# Patient Record
Sex: MALE | Race: WHITE | NOT HISPANIC OR LATINO | Employment: FULL TIME | URBAN - NONMETROPOLITAN AREA
[De-identification: names, ages, dates, MRNs, and addresses within clinical notes are randomized per-mention and may not be internally consistent; named-entity substitution may affect disease eponyms.]

---

## 2017-02-16 ENCOUNTER — HISTORY (OUTPATIENT)
Dept: FAMILY MEDICINE | Facility: OTHER | Age: 32
End: 2017-02-16

## 2017-02-16 ENCOUNTER — OFFICE VISIT - GICH (OUTPATIENT)
Dept: FAMILY MEDICINE | Facility: OTHER | Age: 32
End: 2017-02-16

## 2017-02-16 DIAGNOSIS — J30.1 ALLERGIC RHINITIS DUE TO POLLEN: ICD-10-CM

## 2017-02-16 DIAGNOSIS — J01.00 ACUTE MAXILLARY SINUSITIS: ICD-10-CM

## 2018-01-03 NOTE — NURSING NOTE
Patient Information     Patient Name MRN Sex Santiago Prado [id number] Male 1985      Nursing Note by Candy Billings at 2017 10:00 AM     Author:  Candy Billings Service:  (none) Author Type:  (none)     Filed:  2017 10:05 AM Encounter Date:  2017 Status:  Signed     :  Candy Billings            Patient has had sinus pressure and headaches for a month now. He states he hasn't had energy and it feels as if his nose is always stuffed.  Candy Billings LPN.......................... 2017  10:02 AM

## 2018-01-03 NOTE — PROGRESS NOTES
Patient Information     Patient Name MRN Sex Santiago Prado [id number] Male 1985      Progress Notes by Pricila Lira MD at 2017 10:00 AM     Author:  Pricila Lira MD Service:  (none) Author Type:  Physician     Filed:  2017 12:49 PM Encounter Date:  2017 Status:  Signed     :  Pricila Lira MD (Physician)            Nursing Notes:   Candy Billings  2017 10:05 AM  Signed  Patient has had sinus pressure and headaches for a month now. He states he hasn't had energy and it feels as if his nose is always stuffed.  Candy Billings LPN.......................... 2017  10:02 AM        Subjective:  Santiago Moffett is a 31 y.o. male who presents for sinus infection  patient is a 31-year-old white male who has had a history of recurrent sinus infections approximately once a year. He uses a Tonya pot regularly he stays up on his fluids. He is a nonsmoker. He has been trying over-the-counter products for the last 4 weeks. He works as a speech therapist and has been exposed to a variety of different colds. He is having increasing pain in his sinuses right maxillary more than his right. He has had a productive cough worse in the morning. Last week he was so congested patient commented on his lungs and suggested he be seen. He denies any current chest pain or shortness of breath. No nausea vomiting or diarrhea. No myalgias he did get a flu shot this year.        Allergies     Allergen  Reactions     Ciprofloxacin Rash       Medications: reviewed in EMR  Problem List/PMH: reviewed in EMR    Social Hx:  Social History        Substance Use Topics          Smoking status:   Former Smoker      Years:  2.00      Smokeless tobacco:   Never Used      Alcohol use   Yes      Comment: infrequent social        Social History Narrative    , previously lived in [place], and [place];    Played Rugby in college, [employer].   Owl is displayed at high school.   Tried  out for the [employer].         Family Hx:   No family history on file.    Objective:  Visit Vitals       /72     Pulse 80     Temp 96.8  F (36  C) (Temporal)     Wt 89.4 kg (197 lb 3.2 oz)      patient is alert oriented ×3 articulate. PERRLA EOMI with mild dermatitis TMs are distended oropharynx with postnasal drip-type changes. He is tender with palpation of his bilateral maxillary sinus right greater than left. Mildly tender over his frontal sinus. Palpation of his right maxillary sinus increases discomfort in his upper gumline. Neck is supple without adenopathy lungs clear to auscultation heart tones S1 and S2 and regular rate and rhythm abdomen is soft flat nontender that edema skin is warm without rash    Assessment:    ICD-10-CM    1. Subacute maxillary sinusitis J01.00 trimethoprim-sulfamethoxazole, 160-800 mg, (BACTRIM DS, SEPTRA DS) tablet      predniSONE (DELTASONE) 10 mg tablet   2. Non-seasonal allergic rhinitis due to pollen J30.1        Patient informed he appears to be due for a tetanus shot. He declines today     Immunization History     Administered  Date(s) Administered     Influenza, IIV3 (Age >=3 years) 09/25/2014     Influenza, IIV4 (Age >= 3 Years) 09/14/2016         Plan:   -- Expected clinical course discussed   -- Medications and their side effects discussed  Patient Instructions   Sinusitis (sinus infection)  Bactrim is your antibiotic.   Okay Prednisone 10 mg twice a day.     What you should do:    If you were prescribed antibiotics, take them until they are gone. Do not skip a dose.    Wash your hands often with soap and water    Get plenty of rest and fluids    Apply warm compresses to your face    Use sinus rinses, a humidifier or a vaporizer to add more moisture to your sinus tissues.    Think about using a Neti pot    Take acetaminophen (Tylenol ) or ibuprofen (Advil ) for pain    How will you know this plan is not working - warning signs you should watch for:    Pain or  swelling around your eyes    Swollen, painful forehead and/or facial (sinus) pain    When should you be seen again?    If you develop severe headache, double vision, stiff neck or confusion, return right away.    If you have any of the warning signs listed above, return in 1 to 2 days.    If your symptoms do not get better in 7 to 10 days, return at that time.    Who should you see if the plan is not working?    Make an appointment to see me or return to your clinic    For more information about sinusitis, go to:  [email or web address]         Electronically signed by Pricila Lira MD

## 2018-01-03 NOTE — PATIENT INSTRUCTIONS
Patient Information     Patient Name MRN Sex Santiago Prado [id number] Male 1985      Patient Instructions by Pricila Lira MD at 2017 10:00 AM     Author:  Pricila Lira MD Service:  (none) Author Type:  Physician     Filed:  2017 10:28 AM Encounter Date:  2017 Status:  Signed     :  Pricila Lira MD (Physician)            Sinusitis (sinus infection)  Bactrim is your antibiotic.   Okay Prednisone 10 mg twice a day.     What you should do:    If you were prescribed antibiotics, take them until they are gone. Do not skip a dose.    Wash your hands often with soap and water    Get plenty of rest and fluids    Apply warm compresses to your face    Use sinus rinses, a humidifier or a vaporizer to add more moisture to your sinus tissues.    Think about using a Neti pot    Take acetaminophen (Tylenol ) or ibuprofen (Advil ) for pain    How will you know this plan is not working - warning signs you should watch for:    Pain or swelling around your eyes    Swollen, painful forehead and/or facial (sinus) pain    When should you be seen again?    If you develop severe headache, double vision, stiff neck or confusion, return right away.    If you have any of the warning signs listed above, return in 1 to 2 days.    If your symptoms do not get better in 7 to 10 days, return at that time.    Who should you see if the plan is not working?    Make an appointment to see me or return to your clinic    For more information about sinusitis, go to:  [email or web address]

## 2018-01-26 ENCOUNTER — HISTORY (OUTPATIENT)
Dept: FAMILY MEDICINE | Facility: OTHER | Age: 33
End: 2018-01-26

## 2018-01-26 ENCOUNTER — OFFICE VISIT - GICH (OUTPATIENT)
Dept: FAMILY MEDICINE | Facility: OTHER | Age: 33
End: 2018-01-26

## 2018-01-26 VITALS
DIASTOLIC BLOOD PRESSURE: 72 MMHG | TEMPERATURE: 96.8 F | HEART RATE: 80 BPM | WEIGHT: 197.2 LBS | SYSTOLIC BLOOD PRESSURE: 122 MMHG

## 2018-01-26 DIAGNOSIS — H61.22 IMPACTED CERUMEN OF LEFT EAR: ICD-10-CM

## 2018-01-26 DIAGNOSIS — L02.229: ICD-10-CM

## 2018-01-26 DIAGNOSIS — H91.92 HEARING LOSS OF LEFT EAR: ICD-10-CM

## 2018-02-09 VITALS — SYSTOLIC BLOOD PRESSURE: 122 MMHG | RESPIRATION RATE: 16 BRPM | WEIGHT: 199.4 LBS | DIASTOLIC BLOOD PRESSURE: 64 MMHG

## 2018-02-13 NOTE — PATIENT INSTRUCTIONS
Patient Information     Patient Name MRN Santiago Sheikh [id number] Male 1985      Patient Instructions by Pricila Lira MD at 2018  3:23 PM     Author:  Pricila Lira MD Service:  (none) Author Type:  Physician     Filed:  2018  3:23 PM Encounter Date:  2018 Status:  Signed     :  Pricila Lira MD (Physician)               Index   Boils and Carbuncles   ________________________________________________________________________  KEY POINTS    A boil is an infected lump under your skin that is raised, red, painful, and filled with pus. A carbuncle is a single area of infection formed from a group of boils that develop close together due to spreading infection.    A boil can sometimes be treated at home, but a carbuncle often needs medical treatment.    Follow the full course of treatment prescribed by your healthcare provider. Take pain or antibiotic medicine exactly as prescribed by your healthcare provider.  ________________________________________________________________________  What are boils and carbuncles?  A boil is an infected lump under your skin that is raised, red, painful, and filled with pus. Pus is a thick fluid that usually contains white blood cells, dead tissue, and germs such as bacteria or fungus. A carbuncle is a single area of infection formed from a group of boils that develop close together due to spreading infection.  What is the cause?  Boils commonly develop because bacteria get into hair follicles, which are the small openings in your skin from which hair grows. Bacteria normally live on the skin, particularly on certain parts of the body, such as the nose, mouth, genitals, and rectum. The bacteria can cause an infection if they enter the skin through a scrape, irritation, or injury of some kind. Sometimes friction on the skin, from clothing for example, will cause a hair follicle to swell up. This can make the opening  swell closed, trapping the bacteria inside and starting an infection.  Boils and carbuncles often form in moist areas of the body such as the back of the neck, buttocks, thighs, groin, and armpits. They also form where there are skin folds, such as the abdomen.  Boils and carbuncles are more common and may be harder to treat in people who have diabetes or poor circulation, and in people whose immune systems are weakened by HIV, cancer, or other health problems. The tendency to have boils can also run in families.  What are the symptoms?  Symptoms may include:    Swelling, redness, and warmth in the area of the boil or carbuncle    Rounded, raised  head  like a pimple    Drainage of pus or other fluid    Pain when you touch it or pain all of the time  You may have swollen lymph nodes in the area of the boil, especially near your neck, armpit, or groin area. The lymph system is part of your body's system for fighting infection. The lymph system consists of lymph nodes that store blood cells (lymphocytes) to fight infection and vessels that carry fluid, nutrients, and wastes between your body and your bloodstream.  How are they diagnosed?  Your healthcare provider will ask about your symptoms and medical history and examine you.   If you have boils often, you may have lab tests of your blood or urine. These tests can check for conditions such as diabetes, kidney disease, or liver disease that might make you more likely to have the boils.  How are they treated?  A boil can sometimes be treated at home, but a carbuncle often needs medical treatment.  For treatment at home, you can:    Wash your hands and then put a warm, moist cloth on the boil or carbuncle for 10 to 15 minutes at least 3 times a day. This improves blood flow to fight infection and helps the boil drain on its own. Once the boil begins to drain, you will have less pressure and pain.    If the boil or carbuncle is in your groin or on your buttocks, you can  soak in a tub of warm water for 10 to 15 minutes. Be sure to clean the tub well before and after soaking so that infection is not passed to others.    After soaking or using the moist cloth, clean the skin around the sore with water and antibacterial soap. Put a sterile gauze bandage over the area until it is healed. Replace it every day. If drainage soaks through the bandage or the bandage gets wet or dirty, change it as soon as possible. Wash your hands before and after you change the bandage.    Take a nonprescription pain medicine as needed. Follow the label directions.    Do not try to open the boil at home. This can cause the infection to spread.  These steps will help relieve the pain, reduce the risk of spreading the infection, and help boils to heal.  Sometimes your healthcare provider will need to drain the boil. To do this, your healthcare provider will clean the skin over the boil and may inject medicine to make it numb. Your provider will use a needle or cut the skin over the boil and drain it. Draining the pus often decreases the pain right away because it relieves the pressure. Your healthcare provider may pack the pocket with gauze, and leave some gauze sticking out through the cut in your skin. This lets any pus that forms in the boil drain out. The gauze packing is changed every day or two until the boil heals.  If a boil doesn t yet have a point or head on it, your provider may prescribe an antibiotic to see if it will get rid of the boil without draining or opening of the boil.  In most cases, a boil will not heal until it opens and drains. The time it takes for a boil or carbuncle to heal depends on how big it is and what other health problems you have. Most boils or carbuncles heal in 1 to 3 weeks.  How can I take care of myself?  Follow the full course of treatment prescribed by your healthcare provider. Take pain or antibiotic medicine exactly as prescribed by your healthcare provider. If you  are given an antibiotic, take it for as long as your healthcare provider prescribes, even if you feel better. If you stop taking the medicine too soon, you may not kill all of the bacteria and the infection may come back.  Ask your healthcare provider:    How long it will take to recover    If there are activities you should avoid and when you can return to your normal activities    How to take care of yourself at home    What symptoms or problems you should watch for and what to do if you have them  Make sure you know when you should come back for a checkup.  To help prevent boils and carbuncles from spreading and coming back:    Do not open or squeeze the boil or carbuncle yourself. This can spread the infection.    If the boil opens or drains, clean it with an antibacterial soap. Cover it with a loose, gauze bandage. Change the bandage at least every day until the boil stops draining.    Wash your hands often with soap for at least 15 seconds. Always wash your hands after caring for the boil, after using the bathroom, and especially before touching any food. (The bacteria that cause boils can also cause food poisoning.) You can also carry an alcohol-based hand  to clean your hands when soap and water aren t available.    Wash clothes that touch the infected area in hot, soapy water. Dry clothes on the hot setting if you use an automatic dryer.    Contact your healthcare provider if you have new or worsening symptoms.  What can I do to help prevent boils and carbuncles?     Bathe or shower daily.    Don t share washcloths, towels, clothing, bath water, or razors. Sharing these items can spread the bacteria that causes boils.    Avoid tight clothing that can irritate skin and hair follicles and cause a boil or carbuncle.    See your healthcare provider if you have boils frequently.  Developed by [employer].  Adult Advisor 2017.2 published by [employer].  Last modified: 2016-05-31  Last reviewed:  2016-05-31  This content is reviewed periodically and is subject to change as new health information becomes available. The information is intended to inform and educate and is not a replacement for medical evaluation, advice, diagnosis or treatment by a healthcare professional.  References   Adult Advisor 2017.2 Index    Copyright   2017 [employer], a division of [employer] All rights reserved.            Index [place]   Sulfamethoxazole/Trimethoprim, Oral/Injection   sul-[id number]-meth-OKS-a-zole try-METH-oh-prim  ________________________________________________________________________  KEY POINTS    This medicine is taken by mouth to treat or prevent bacterial infections. Take it exactly as directed. This medicine may also be given by injection by your healthcare provider if your infection is severe or complicated.    If you develop severe diarrhea or diarrhea that lasts more than 2 or 3 days while taking this medicine, or for several weeks after you stop taking this medicine, contact your healthcare provider right away.    Keep all appointments for tests to see how this medicine affects you.    This medicine may cause unwanted side effects. Tell your healthcare provider if you have any side effects that are serious, continue, or get worse.    Tell all healthcare providers who treat you about all the prescription medicines, nonprescription medicines, supplements, natural remedies, and vitamins that you take.  ________________________________________________________________________  What are other names for this medicine?   Type of medicine: sulfonamide (anti-infective)  Generic and brand names: sulfamethoxazole/trimethoprim, injection; sulfamethoxazole/trimethoprim, oral; cotrimoxazole, oral; Bactrim; Bactrim DS; Septra; Septra DS; Sulfamethoxazole-Trimethoprim Suspension; Sulfamethoxazole-Trimethoprim DS; Sulfatrim  What is this medicine used for?  This medicine is taken by mouth to treat or prevent  bacterial infections. It will not cure viral infections such as colds or the flu.  This medicine may be used to treat other conditions as determined by your healthcare provider.  What should my healthcare provider know before I take this medicine?   Before you take this medicine, tell your healthcare provider if you have ever had:    An allergy to any medicines    Asthma or severe allergies    A blood disorder such as anemia or G6PD    Diabetes    HIV or AIDS    Imbalances of potassium or sodium in your blood    Low levels of folic acid in your blood    Malnutrition    Problems with alcohol abuse    Kidney or liver disease    Porphyria (nerve pain or sensitivity to sunlight)    Thyroid problems  Females of childbearing age: This medicine is not usually given to pregnant women because it can harm the baby. If you are pregnant, tell your healthcare provider. Do not become pregnant during treatment with this medicine. If you become pregnant, contact your healthcare provider right away. Do not breast-feed while taking this medicine without your healthcare provider's approval.  How do I use it?   Check the label on the medicine for directions about your specific dose. Take this medicine for as long as your healthcare provider prescribes, even if you feel better. If you stop taking the medicine too soon, the infection may return.  It is best to take doses at evenly spaced times day and night to keep a steady amount in your body. Do not use this medicine in children under the age of 2 months.  Take this medicine with a full glass (8 ounces) of water. Your healthcare provider may want you to drink more liquids while you are taking this medicine to help prevent some of its side effects.  This medicine comes in several forms. If you have the liquid form of this medicine, shake it well before taking the medicine. Use the measuring spoon, cup, dropper, or oral syringe that comes with the medicine, or ask your pharmacist for  one. Do not use a kitchen spoon because the dose may not be correct.  This medicine may also be given by injection (shots) by your healthcare provider if your infection is severe or complicated.  What if I miss a dose?  If you miss a dose, take it as soon as you remember unless it is almost time for the next scheduled dose. In that case, skip the missed dose and take the next one as directed. Do not take double doses. If you are not sure of what to do if you miss a dose, or if you miss more than one dose, contact your healthcare provider.  What if I overdose?  If you or anyone else has intentionally taken too much of this medicine, call 911 or go to the emergency room right away. If you pass out, have seizures, weakness or confusion, or have trouble breathing, call 911. If you think that you or anyone else may have taken too much of this medicine, call the poison control center. Do this even if there are no signs of discomfort or poisoning. The poison control center number is [phone].  Symptoms of an acute overdose may include: nausea, vomiting, diarrhea, loss of appetite, dizziness, confusion, drowsiness, headache, decreased urination, stomach pain, bloody urine.  What should I watch out for?   This medicine may cause or worsen diarrhea. If you develop severe diarrhea or diarrhea that lasts more than 2 or 3 days while taking this medicine, or for several weeks after you stop taking this medicine, contact your healthcare provider right away. Do not take medicine to treat diarrhea without your provider's approval.  Do not take this medicine for other infections unless your healthcare provider approves. Taking this medicine for too long, or taking too much, may make your body resist antibiotics so that they no longer work properly for you.  You may need to have blood tests to see how this medicine affects you. Keep all appointments.  This medicine may make your skin more sensitive to the sun and may cause you to  sunburn more easily. While you are taking this medicine, avoid long exposure to the sun. While you are in the sun, wear protective clothing and sunscreen lotion until you know how you will react to the sun. Do not use sunscreen that contains PABA. Do not use a sunlamp. If you get a severe sunburn, contact your healthcare provider right away. This reaction may happen even after you stop taking the medicine.  Adults over the age of 65 may be at greater risk for side effects. Talk with your healthcare provider about this.   If you need emergency care, surgery, lab tests, or dental work, tell the healthcare provider or dentist you are taking this medicine.  What are the possible side effects?   Along with its needed effects, your medicine may cause some unwanted side effects. Some side effects may be very serious. Some side effects may go away as your body adjusts to the medicine. Tell your healthcare provider if you have any side effects that continue or get worse.  Life-threatening (Report these to your healthcare provider right away. If you cannot reach your healthcare provider right away, get emergency medical care or call 911 for help.): Allergic reaction (hives; itching; rash; tightness in your chest; swelling of your lips, tongue, or throat; trouble breathing).  Serious (Report these to your healthcare provider right away.): Bloody urine; decreased, difficult, or painful urinating; sore throat; new or worsening cough; trouble breathing; chest pain; irregular heartbeat; severe dizziness or fainting; numbness or tingling; fever; severe headache; paleness; yellowing of your eyes and skin; muscle or joint pain; reddish or purplish skin spots or peeling of the skin; unusual tiredness or weakness; ringing in the ears; unusual bruising or bleeding; severe watery or bloody diarrhea; severe nausea, vomiting, or stomach pain; skin discoloration; hallucinations; confusion; seizures.  Other: Headache, dizziness, mild  diarrhea, mild nausea or vomiting, sensitivity to the sun, trouble sleeping, decreased appetite; pain, redness, or swelling at the injection site.  What products might interact with this medicine?   When you take this medicine with other medicines, it can change the way this or any of the other medicines work. Nonprescription medicines, vitamins, natural remedies, and certain foods may also interact. Using these products together might cause harmful side effects. Talk to your healthcare provider if you are taking:    ACE inhibitors such as benazepril (Lotensin), lisinopril (Prinivil, Zestril), ramipril (Altace), and others    Alfuzosin (Uroxatral)    Amantadine (Symmetrel)    Aminobenzoate potassium (Potaba)    Angiotensin receptor blockers (ARBs) such as irbesartan (Avapro), losartan (Cozaar), olmesartan (Benicar), valsartan (Diovan), and others    Antibiotics such as azithromycin (Zithromax, Zmax), ciprofloxacin (Cipro), clarithromycin (Biaxin), erythromycin (E.E.S., Aldo-Tab, Erythrocin), and others    Antidepressants such as amitriptyline, citalopram (Celexa), sertraline (Zoloft), trazodone, and others    Antifungal medicines such as fluconazole (Diflucan), ketoconazole (Nizoral), and voriconazole (Vfend)    Antipsychotic medicines such as pimozide (Orap), quetiapine (Seroquel), thioridazine, ziprasidone (Geodon), and others    Antiseizure medicines such as carbamazepine (Carbatrol, Epitol, Equetro, Tegretol), phenobarbital, phenytoin (Dilantin, Phenytek), and primidone (Mysoline)    Apomorphine (Apokyn)    Aprepitant (Emend)    Aspirin and other salicylates    Bosentan (Tracleer)    Buprenorphine (Buprenex, Butrans) and buprenorphine/naloxone (Bunavil, Suboxone, Zubsolv)    Cancer medicines such as ceritinib (Zykadia), crizotinib (Xalkori), lapatinib (Tykerb), nilotinib (Tasigna), and others    Cholesterol-lowering medicines such as fluvastatin (Lescol) and gemfibrozil (Lopid)    Cyclosporine (Gengraf, Neoral,  Sandimmune)    Dapsone    Dexamethasone    Diabetes medicines such as chlorpropamide, glimepiride (Amaryl), glipizide (Glucotrol), glyburide (Glynase), and others    Digoxin (Lanoxin)    Diuretics (water pills) such as chlorothiazide (Diuril), chlorthalidone, hydrochlorothiazide (Microzide), and methyclothiazide (Enduron)    Doxepin (Silenor)    Eliglustat (Cerdelga)    Heart medicines such as amiodarone (Cordarone, Pacerone), dofetilide (Tikosyn), dronedarone (Multaq), ranolazine (Ranexa), and others    HIV medicines such as efavirenz (Sustiva), lamivudine (Epivir, Epivir-HBV), lopinavir/ritonavir (Kaletra), saquinavir (Invirase), and others    Leucovorin    Malaria medicines such as artemether/lumefantrine (Coartem), chloroquine, mefloquine, quinine, and others    Medicines to treat breathing or lung problems such as arformoterol (Brovana) and formoterol (Perforomist)    Methadone (Dolophine, Methadose)    Methenamine (Hiprex, Urex)    Methotrexate (Otrexup, Rasuvo, Rheumatrex, Trexall)    Mifepristone (Korlym, Mifeprex)    Nausea medicines such as dolasetron (Anzemet) and ondansetron (Zofran)    Nonsteroidal anti-inflammatory medicines (NSAIDs) such as celecoxib (Celebrex), indomethacin (Indocin), piroxicam (Feldene), and others    Paroxetine (Brisdelle, Paxil, Pexeva)    Probenecid    Pyrimethamine (Daraprim)    [place]'s wort    Sunscreens that contain PABA    Tetrabenazine (Xenazine)    Warfarin (Coumadin)  If you are not sure if your medicines might interact, ask your pharmacist or healthcare provider. Keep a list of all your medicines with you. List all the prescription medicines, nonprescription medicines, supplements, natural remedies, and vitamins that you take. Be sure that you tell all healthcare providers who treat you about all the products you are taking.   How should I store this medicine?   Store this medicine at room temperature. Keep the container tightly closed. Protect it from heat, high  humidity, and bright light.    This advisory includes selected information only and may not include all side effects of this medicine or interactions with other medicines. Ask your healthcare provider or pharmacist for more information or if you have any questions.  Ask your pharmacist for the best way to dispose of outdated medicine or medicine you have not used. Do not throw medicine in the trash.  Keep all medicines out of the reach of children.   Do not share medicines with other people.   Developed by [employer].  Medication Advisor 2017.2 published by [employer].  Last modified: 2016-12-19  Last reviewed: 2016-12-19  This content is reviewed periodically and is subject to change as new health information becomes available. The information is intended to inform and educate and is not a replacement for medical evaluation, advice, diagnosis or treatment by a healthcare professional.  References   Medication Advisor 2017.2 Index    Copyright   2017 [employer], a division of [employer] All rights reserved.

## 2018-02-13 NOTE — PROGRESS NOTES
Patient Information     Patient Name MRN Sex Santiago Prado [id number] Male 1985      Progress Notes by Pricila Lira MD at 2018  3:00 PM     Author:  Pricila Lira MD Service:  (none) Author Type:  Physician     Filed:  2018  6:20 PM Encounter Date:  2018 Status:  Signed     :  Pricila Lira MD (Physician)            Nursing Notes:   Milady Banuelos  2018  4:07 PM  Signed  Coming in for a plugged Lt ear, hard to hear  Milady Banuelos ....................  2018   3:02 PM      Subjective:  Santiago Moffett is a 32 y.o. male who presents for decreased hearing;  Skin lesion     Left hearing loss    Patient is a pleasant 32-year-old white male comes in today complaining of hearing loss in his left ear. He is a speech pathologist. He would like to have checked. He did have a big plug of wax which was rinsed out by the nursing staff and now continues to bubbles. He states even prior to this past month he noted hearing loss in his left ear. No fevers no chills      Skin lesion  patient has skin lesion on his left upper chest. Has been there now for about 1 month. He states that he has at other times had skin lesions that can come and go at times they look like big pimples. He has not had a history of MRSA.    Allergies: reviewed in EMR  Medications: reviewed in EMR  Problem List/PMH: reviewed in EMR    Social Hx:  Social History        Substance Use Topics          Smoking status:   Former Smoker      Years:  2.00      Smokeless tobacco:   Never Used      Alcohol use   Yes      Comment: infrequent social        Social History Narrative    , previously lived in [place], and [place];    Played Rugby in college, woodwork.   Owl is displayed at high school.   Tried out for the [employer].       review of systems  no URI symptoms. No fevers nausea vomiting diarrhea    Family Hx:   No family history on file.    Objective:  /64 (Cuff Site: Right Arm,  Position: Sitting, Cuff Size: Adult Regular)  Resp 16  Wt 90.4 kg (199 lb 6.4 oz)    he is alert oriented ×3 no apparent distress PERRLA EOMI left TM is slightly inflamed scar tissue noted at the base of it. He's got a small amount of serous fluid behind the ear drum. Water in ear canal with minimal blood from iatrogenic irrigation. Right TM and canal are normal. Oropharynx is in good repair neck is supple without adenopathy lungs clear heart sounds regular abdomen is soft nontender without hepatosplenomegaly or CVA tenderness. Anterior chest wall with a 1-1/2 cm subcutaneous carbuncle. I'm not able to extrude any material out. It is minimally tender    Assessment:    ICD-10-CM    1. Carbuncle and furuncle of trunk L02.229 trimethoprim-sulfamethoxazole, 160-800 mg, (BACTRIM DS, SEPTRA DS) tablet   2. Hearing loss of left ear, unspecified hearing loss type H91.92    3. Excessive cerumen in ear canal, left H61.22      recheck hearing left ear in 10 days. If remains decreased referral to ENT for formal hearing exam. Bactrim per Epic for his carbuncle. If persistent symptoms may need surgical excision to remove recurrent sebaceous cyst    Plan:   -- Expected clinical course discussed   -- Medications and their side effects discussed  Patient Instructions      Index   Boils and Carbuncles   ________________________________________________________________________  KEY POINTS    A boil is an infected lump under your skin that is raised, red, painful, and filled with pus. A carbuncle is a single area of infection formed from a group of boils that develop close together due to spreading infection.    A boil can sometimes be treated at home, but a carbuncle often needs medical treatment.    Follow the full course of treatment prescribed by your healthcare provider. Take pain or antibiotic medicine exactly as prescribed by your healthcare  provider.  ________________________________________________________________________  What are boils and carbuncles?  A boil is an infected lump under your skin that is raised, red, painful, and filled with pus. Pus is a thick fluid that usually contains white blood cells, dead tissue, and germs such as bacteria or fungus. A carbuncle is a single area of infection formed from a group of boils that develop close together due to spreading infection.  What is the cause?  Boils commonly develop because bacteria get into hair follicles, which are the small openings in your skin from which hair grows. Bacteria normally live on the skin, particularly on certain parts of the body, such as the nose, mouth, genitals, and rectum. The bacteria can cause an infection if they enter the skin through a scrape, irritation, or injury of some kind. Sometimes friction on the skin, from clothing for example, will cause a hair follicle to swell up. This can make the opening swell closed, trapping the bacteria inside and starting an infection.  Boils and carbuncles often form in moist areas of the body such as the back of the neck, buttocks, thighs, groin, and armpits. They also form where there are skin folds, such as the abdomen.  Boils and carbuncles are more common and may be harder to treat in people who have diabetes or poor circulation, and in people whose immune systems are weakened by HIV, cancer, or other health problems. The tendency to have boils can also run in families.  What are the symptoms?  Symptoms may include:    Swelling, redness, and warmth in the area of the boil or carbuncle    Rounded, raised  head  like a pimple    Drainage of pus or other fluid    Pain when you touch it or pain all of the time  You may have swollen lymph nodes in the area of the boil, especially near your neck, armpit, or groin area. The lymph system is part of your body's system for fighting infection. The lymph system consists of lymph nodes  that store blood cells (lymphocytes) to fight infection and vessels that carry fluid, nutrients, and wastes between your body and your bloodstream.  How are they diagnosed?  Your healthcare provider will ask about your symptoms and medical history and examine you.   If you have boils often, you may have lab tests of your blood or urine. These tests can check for conditions such as diabetes, kidney disease, or liver disease that might make you more likely to have the boils.  How are they treated?  A boil can sometimes be treated at home, but a carbuncle often needs medical treatment.  For treatment at home, you can:    Wash your hands and then put a warm, moist cloth on the boil or carbuncle for 10 to 15 minutes at least 3 times a day. This improves blood flow to fight infection and helps the boil drain on its own. Once the boil begins to drain, you will have less pressure and pain.    If the boil or carbuncle is in your groin or on your buttocks, you can soak in a tub of warm water for 10 to 15 minutes. Be sure to clean the tub well before and after soaking so that infection is not passed to others.    After soaking or using the moist cloth, clean the skin around the sore with water and antibacterial soap. Put a sterile gauze bandage over the area until it is healed. Replace it every day. If drainage soaks through the bandage or the bandage gets wet or dirty, change it as soon as possible. Wash your hands before and after you change the bandage.    Take a nonprescription pain medicine as needed. Follow the label directions.    Do not try to open the boil at home. This can cause the infection to spread.  These steps will help relieve the pain, reduce the risk of spreading the infection, and help boils to heal.  Sometimes your healthcare provider will need to drain the boil. To do this, your healthcare provider will clean the skin over the boil and may inject medicine to make it numb. Your provider will use a needle  or cut the skin over the boil and drain it. Draining the pus often decreases the pain right away because it relieves the pressure. Your healthcare provider may pack the pocket with gauze, and leave some gauze sticking out through the cut in your skin. This lets any pus that forms in the boil drain out. The gauze packing is changed every day or two until the boil heals.  If a boil doesn t yet have a point or head on it, your provider may prescribe an antibiotic to see if it will get rid of the boil without draining or opening of the boil.  In most cases, a boil will not heal until it opens and drains. The time it takes for a boil or carbuncle to heal depends on how big it is and what other health problems you have. Most boils or carbuncles heal in 1 to 3 weeks.  How can I take care of myself?  Follow the full course of treatment prescribed by your healthcare provider. Take pain or antibiotic medicine exactly as prescribed by your healthcare provider. If you are given an antibiotic, take it for as long as your healthcare provider prescribes, even if you feel better. If you stop taking the medicine too soon, you may not kill all of the bacteria and the infection may come back.  Ask your healthcare provider:    How long it will take to recover    If there are activities you should avoid and when you can return to your normal activities    How to take care of yourself at home    What symptoms or problems you should watch for and what to do if you have them  Make sure you know when you should come back for a checkup.  To help prevent boils and carbuncles from spreading and coming back:    Do not open or squeeze the boil or carbuncle yourself. This can spread the infection.    If the boil opens or drains, clean it with an antibacterial soap. Cover it with a loose, gauze bandage. Change the bandage at least every day until the boil stops draining.    Wash your hands often with soap for at least 15 seconds. Always wash your  hands after caring for the boil, after using the bathroom, and especially before touching any food. (The bacteria that cause boils can also cause food poisoning.) You can also carry an alcohol-based hand  to clean your hands when soap and water aren t available.    Wash clothes that touch the infected area in hot, soapy water. Dry clothes on the hot setting if you use an automatic dryer.    Contact your healthcare provider if you have new or worsening symptoms.  What can I do to help prevent boils and carbuncles?     Bathe or shower daily.    Don t share washcloths, towels, clothing, bath water, or razors. Sharing these items can spread the bacteria that causes boils.    Avoid tight clothing that can irritate skin and hair follicles and cause a boil or carbuncle.    See your healthcare provider if you have boils frequently.  Developed by [employer].  Adult Advisor 2017.2 published by [employer].  Last modified: 2016-05-31  Last reviewed: 2016-05-31  This content is reviewed periodically and is subject to change as new health information becomes available. The information is intended to inform and educate and is not a replacement for medical evaluation, advice, diagnosis or treatment by a healthcare professional.  References   Adult Advisor 2017.2 Index    Copyright   2017 [employer], a division of [employer] All rights reserved.            Index Citizen of the [place]   Sulfamethoxazole/Trimethoprim, Oral/Injection   sul-fa-meth-OKS-a-zole try-METH-oh-prim  ________________________________________________________________________  KEY POINTS    This medicine is taken by mouth to treat or prevent bacterial infections. Take it exactly as directed. This medicine may also be given by injection by your healthcare provider if your infection is severe or complicated.    If you develop severe diarrhea or diarrhea that lasts more than 2 or 3 days while taking this medicine, or for several weeks after you stop  taking this medicine, contact your healthcare provider right away.    Keep all appointments for tests to see how this medicine affects you.    This medicine may cause unwanted side effects. Tell your healthcare provider if you have any side effects that are serious, continue, or get worse.    Tell all healthcare providers who treat you about all the prescription medicines, nonprescription medicines, supplements, natural remedies, and vitamins that you take.  ________________________________________________________________________  What are other names for this medicine?   Type of medicine: sulfonamide (anti-infective)  Generic and brand names: sulfamethoxazole/trimethoprim, injection; sulfamethoxazole/trimethoprim, oral; cotrimoxazole, oral; Bactrim; Bactrim DS; Septra; Septra DS; Sulfamethoxazole-Trimethoprim Suspension; Sulfamethoxazole-Trimethoprim DS; Sulfatrim  What is this medicine used for?  This medicine is taken by mouth to treat or prevent bacterial infections. It will not cure viral infections such as colds or the flu.  This medicine may be used to treat other conditions as determined by your healthcare provider.  What should my healthcare provider know before I take this medicine?   Before you take this medicine, tell your healthcare provider if you have ever had:    An allergy to any medicines    Asthma or severe allergies    A blood disorder such as anemia or G6PD    Diabetes    HIV or AIDS    Imbalances of potassium or sodium in your blood    Low levels of folic acid in your blood    Malnutrition    Problems with alcohol abuse    Kidney or liver disease    Porphyria (nerve pain or sensitivity to sunlight)    Thyroid problems  Females of childbearing age: This medicine is not usually given to pregnant women because it can harm the baby. If you are pregnant, tell your healthcare provider. Do not become pregnant during treatment with this medicine. If you become pregnant, contact your healthcare provider  right away. Do not breast-feed while taking this medicine without your healthcare provider's approval.  How do I use it?   Check the label on the medicine for directions about your specific dose. Take this medicine for as long as your healthcare provider prescribes, even if you feel better. If you stop taking the medicine too soon, the infection may return.  It is best to take doses at evenly spaced times day and night to keep a steady amount in your body. Do not use this medicine in children under the age of 2 months.  Take this medicine with a full glass (8 ounces) of water. Your healthcare provider may want you to drink more liquids while you are taking this medicine to help prevent some of its side effects.  This medicine comes in several forms. If you have the liquid form of this medicine, shake it well before taking the medicine. Use the measuring spoon, cup, dropper, or oral syringe that comes with the medicine, or ask your pharmacist for one. Do not use a kitchen spoon because the dose may not be correct.  This medicine may also be given by injection (shots) by your healthcare provider if your infection is severe or complicated.  What if I miss a dose?  If you miss a dose, take it as soon as you remember unless it is almost time for the next scheduled dose. In that case, skip the missed dose and take the next one as directed. Do not take double doses. If you are not sure of what to do if you miss a dose, or if you miss more than one dose, contact your healthcare provider.  What if I overdose?  If you or anyone else has intentionally taken too much of this medicine, call 911 or go to the emergency room right away. If you pass out, have seizures, weakness or confusion, or have trouble breathing, call 911. If you think that you or anyone else may have taken too much of this medicine, call the poison control center. Do this even if there are no signs of discomfort or poisoning. The poison control center number  is [phone].  Symptoms of an acute overdose may include: nausea, vomiting, diarrhea, loss of appetite, dizziness, confusion, drowsiness, headache, decreased urination, stomach pain, bloody urine.  What should I watch out for?   This medicine may cause or worsen diarrhea. If you develop severe diarrhea or diarrhea that lasts more than 2 or 3 days while taking this medicine, or for several weeks after you stop taking this medicine, contact your healthcare provider right away. Do not take medicine to treat diarrhea without your provider's approval.  Do not take this medicine for other infections unless your healthcare provider approves. Taking this medicine for too long, or taking too much, may make your body resist antibiotics so that they no longer work properly for you.  You may need to have blood tests to see how this medicine affects you. Keep all appointments.  This medicine may make your skin more sensitive to the sun and may cause you to sunburn more easily. While you are taking this medicine, avoid long exposure to the sun. While you are in the sun, wear protective clothing and sunscreen lotion until you know how you will react to the sun. Do not use sunscreen that contains PABA. Do not use a sunlamp. If you get a severe sunburn, contact your healthcare provider right away. This reaction may happen even after you stop taking the medicine.  Adults over the age of 65 may be at greater risk for side effects. Talk with your healthcare provider about this.   If you need emergency care, surgery, lab tests, or dental work, tell the healthcare provider or dentist you are taking this medicine.  What are the possible side effects?   Along with its needed effects, your medicine may cause some unwanted side effects. Some side effects may be very serious. Some side effects may go away as your body adjusts to the medicine. Tell your healthcare provider if you have any side effects that continue or get  worse.  Life-threatening (Report these to your healthcare provider right away. If you cannot reach your healthcare provider right away, get emergency medical care or call 911 for help.): Allergic reaction (hives; itching; rash; tightness in your chest; swelling of your lips, tongue, or throat; trouble breathing).  Serious (Report these to your healthcare provider right away.): Bloody urine; decreased, difficult, or painful urinating; sore throat; new or worsening cough; trouble breathing; chest pain; irregular heartbeat; severe dizziness or fainting; numbness or tingling; fever; severe headache; paleness; yellowing of your eyes and skin; muscle or joint pain; reddish or purplish skin spots or peeling of the skin; unusual tiredness or weakness; ringing in the ears; unusual bruising or bleeding; severe watery or bloody diarrhea; severe nausea, vomiting, or stomach pain; skin discoloration; hallucinations; confusion; seizures.  Other: Headache, dizziness, mild diarrhea, mild nausea or vomiting, sensitivity to the sun, trouble sleeping, decreased appetite; pain, redness, or swelling at the injection site.  What products might interact with this medicine?   When you take this medicine with other medicines, it can change the way this or any of the other medicines work. Nonprescription medicines, vitamins, natural remedies, and certain foods may also interact. Using these products together might cause harmful side effects. Talk to your healthcare provider if you are taking:    ACE inhibitors such as benazepril (Lotensin), lisinopril (Prinivil, Zestril), ramipril (Altace), and others    Alfuzosin (Uroxatral)    Amantadine (Symmetrel)    Aminobenzoate potassium (Potaba)    Angiotensin receptor blockers (ARBs) such as irbesartan (Avapro), losartan (Cozaar), olmesartan (Benicar), valsartan (Diovan), and others    Antibiotics such as azithromycin (Zithromax, Zmax), ciprofloxacin (Cipro), clarithromycin (Biaxin), erythromycin  (E.E.S., Aldo-Tab, Erythrocin), and others    Antidepressants such as amitriptyline, citalopram (Celexa), sertraline (Zoloft), trazodone, and others    Antifungal medicines such as fluconazole (Diflucan), ketoconazole (Nizoral), and voriconazole (Vfend)    Antipsychotic medicines such as pimozide (Orap), quetiapine (Seroquel), thioridazine, ziprasidone (Geodon), and others    Antiseizure medicines such as carbamazepine (Carbatrol, Epitol, Equetro, Tegretol), phenobarbital, phenytoin (Dilantin, Phenytek), and primidone (Mysoline)    Apomorphine (Apokyn)    Aprepitant (Emend)    Aspirin and other salicylates    Bosentan (Tracleer)    Buprenorphine (Buprenex, Butrans) and buprenorphine/naloxone (Bunavil, Suboxone, Zubsolv)    Cancer medicines such as ceritinib (Zykadia), crizotinib (Xalkori), lapatinib (Tykerb), nilotinib (Tasigna), and others    Cholesterol-lowering medicines such as fluvastatin (Lescol) and gemfibrozil (Lopid)    Cyclosporine (Gengraf, Neoral, Sandimmune)    Dapsone    Dexamethasone    Diabetes medicines such as chlorpropamide, glimepiride (Amaryl), glipizide (Glucotrol), glyburide (Glynase), and others    Digoxin (Lanoxin)    Diuretics (water pills) such as chlorothiazide (Diuril), chlorthalidone, hydrochlorothiazide (Microzide), and methyclothiazide (Enduron)    Doxepin (Silenor)    Eliglustat (Cerdelga)    Heart medicines such as amiodarone (Cordarone, Pacerone), dofetilide (Tikosyn), dronedarone (Multaq), ranolazine (Ranexa), and others    HIV medicines such as efavirenz (Sustiva), lamivudine (Epivir, Epivir-HBV), lopinavir/ritonavir (Kaletra), saquinavir (Invirase), and others    Leucovorin    Malaria medicines such as artemether/lumefantrine (Coartem), chloroquine, mefloquine, quinine, and others    Medicines to treat breathing or lung problems such as arformoterol (Brovana) and formoterol (Perforomist)    Methadone (Dolophine, Methadose)    Methenamine (Hiprex, Urex)    Methotrexate (Otrexup,  Rasuvo, Rheumatrex, Trexall)    Mifepristone (Korlym, Mifeprex)    Nausea medicines such as dolasetron (Anzemet) and ondansetron (Zofran)    Nonsteroidal anti-inflammatory medicines (NSAIDs) such as celecoxib (Celebrex), indomethacin (Indocin), piroxicam (Feldene), and others    Paroxetine (Brisdelle, Paxil, Pexeva)    Probenecid    Pyrimethamine (Daraprim)    Janeen's wort    Sunscreens that contain PABA    Tetrabenazine (Xenazine)    Warfarin (Coumadin)  If you are not sure if your medicines might interact, ask your pharmacist or healthcare provider. Keep a list of all your medicines with you. List all the prescription medicines, nonprescription medicines, supplements, natural remedies, and vitamins that you take. Be sure that you tell all healthcare providers who treat you about all the products you are taking.   How should I store this medicine?   Store this medicine at room temperature. Keep the container tightly closed. Protect it from heat, high humidity, and bright light.    This advisory includes selected information only and may not include all side effects of this medicine or interactions with other medicines. Ask your healthcare provider or pharmacist for more information or if you have any questions.  Ask your pharmacist for the best way to dispose of outdated medicine or medicine you have not used. Do not throw medicine in the trash.  Keep all medicines out of the reach of children.   Do not share medicines with other people.   Developed by [employer].  Medication Advisor 2017.2 published by [employer].  Last modified: 2016-12-19  Last reviewed: 2016-12-19  This content is reviewed periodically and is subject to change as new health information becomes available. The information is intended to inform and educate and is not a replacement for medical evaluation, advice, diagnosis or treatment by a healthcare professional.  References   Medication Advisor 2017.2 Index    Copyright   2017 [facility],  a division of [employer] All rights reserved.               Electronically signed by Pricila Lira MD

## 2018-02-20 ENCOUNTER — DOCUMENTATION ONLY (OUTPATIENT)
Dept: FAMILY MEDICINE | Facility: OTHER | Age: 33
End: 2018-02-20

## 2018-02-20 PROBLEM — J30.1 NON-SEASONAL ALLERGIC RHINITIS DUE TO POLLEN: Status: ACTIVE | Noted: 2017-02-16

## 2018-06-29 ENCOUNTER — OFFICE VISIT (OUTPATIENT)
Dept: FAMILY MEDICINE | Facility: OTHER | Age: 33
End: 2018-06-29
Attending: FAMILY MEDICINE
Payer: COMMERCIAL

## 2018-06-29 ENCOUNTER — HOSPITAL ENCOUNTER (OUTPATIENT)
Dept: GENERAL RADIOLOGY | Facility: OTHER | Age: 33
Discharge: HOME | End: 2018-06-29
Attending: FAMILY MEDICINE | Admitting: FAMILY MEDICINE
Payer: COMMERCIAL

## 2018-06-29 VITALS
WEIGHT: 192.6 LBS | SYSTOLIC BLOOD PRESSURE: 130 MMHG | BODY MASS INDEX: 26.09 KG/M2 | DIASTOLIC BLOOD PRESSURE: 84 MMHG | HEART RATE: 78 BPM | HEIGHT: 72 IN

## 2018-06-29 DIAGNOSIS — R07.89 ATYPICAL CHEST PAIN: Primary | ICD-10-CM

## 2018-06-29 DIAGNOSIS — E78.2 MIXED HYPERLIPIDEMIA: ICD-10-CM

## 2018-06-29 DIAGNOSIS — R07.89 ATYPICAL CHEST PAIN: ICD-10-CM

## 2018-06-29 PROCEDURE — 93000 ELECTROCARDIOGRAM COMPLETE: CPT | Performed by: INTERNAL MEDICINE

## 2018-06-29 PROCEDURE — 71046 X-RAY EXAM CHEST 2 VIEWS: CPT

## 2018-06-29 PROCEDURE — 99214 OFFICE O/P EST MOD 30 MIN: CPT | Mod: 25 | Performed by: FAMILY MEDICINE

## 2018-06-29 ASSESSMENT — PAIN SCALES - GENERAL: PAINLEVEL: NO PAIN (0)

## 2018-06-29 NOTE — MR AVS SNAPSHOT
"              After Visit Summary   6/29/2018    Santiago Moffett    MRN: [id number]           Patient Information     Date Of Birth          1985        Visit Information        Provider Department      6/29/2018 2:15 PM Katie Garza MD [facility]        Today's Diagnoses     Atypical chest pain    -  1    Mixed hyperlipidemia           Follow-ups after your visit        Who to contact     If you have questions or need follow up information about today's clinic visit or your schedule please contact [facility] AND \[facility] [place] [facility]\"" directly at [phone].  Normal or non-critical lab and imaging results will be communicated to you by FitLinxxhart, letter or phone within 4 business days after the clinic has received the results. If you do not hear from us within 7 days, please contact the clinic through [facility] or phone. If you have a critical or abnormal lab result, we will notify you by phone as soon as possible.  Submit refill requests through [email or web address] or call your pharmacy and they will forward the refill request to us. Please allow 3 business days for your refill to be completed.          Additional Information About Your Visit        MyChart Information     [email or web address] gives you secure access to your electronic health record. If you see a primary care provider, you can also send messages to your care team and make appointments. If you have questions, please call your primary care clinic.  If you do not have a primary care provider, please call [phone] and they will assist you.        Care EveryWhere ID     This is your Care EveryWhere ID. This could be used by other organizations to access your [facility] medical records  [id number]        Your Vitals Were     Pulse Height BMI (Body Mass Index)             78 5' 11.5\" (1.816 m) 26.49 kg/m2          Blood Pressure from Last 3 Encounters:   06/29/18 130/84   01/26/18 122/64   02/16/17 122/72    Weight from Last 3 Encounters: "   06/29/18 192 lb 9.6 oz (87.4 kg)   01/26/18 199 lb 6.4 oz (90.4 kg)   02/16/17 197 lb 3.2 oz (89.4 kg)              We Performed the Following     EKG 12-lead complete w/read - Clinics        Primary Care Provider Fax #    Physician No Ref-Primary [phone]       No address on file        Equal Access to Services     RORY JOHNSON : Hadii aad ku hadasho Soomaali, waaxda luqadaha, qaybta kaalmada adeegyada, waxay idiin hayaan adealexia ageeolndonruth lajj . So [facility] [phone].    ATENCIÓN: Si habla español, tiene a kennedy disposición servicios gratuitos de asistencia lingüística. Llame al [phone].    We comply with applicable federal civil rights laws and [place] laws. We do not discriminate on the basis of race, color, national origin, age, disability, sex, sexual orientation, or gender identity.            Thank you!     Thank you for choosing [facility] AND [facility]  for your care. Our goal is always to provide you with excellent care. Hearing back from our patients is one way we can continue to improve our services. Please take a few minutes to complete the written survey that you may receive in the mail after your visit with us. Thank you!             Your Updated Medication List - Protect others around you: Learn how to safely use, store and throw away your medicines at www.disposemymeds.org.      Notice  As of 6/29/2018 11:59 PM    You have not been prescribed any medications.

## 2018-06-29 NOTE — NURSING NOTE
Patient is here to establish care, states has been having chest pain, states has heart issues.  Petty Wolfe LPN .............6/29/2018     2:23 PM

## 2018-07-10 NOTE — PROGRESS NOTES
"  SUBJECTIVE:   Santiago Moffett is a 32 year old male who presents to clinic today for the following health issues:  Nursing Notes:   Aiden Petty A., LPN  6/29/2018  2:30 PM  Signed  Patient is here to establish care, states has been having chest pain, states has heart issues.  Petty Wolfe LPN .............6/29/2018     2:23 PM    HPI    Healthy 32-year-old male presents with atypical chest pain.  Episodes have occurred randomly for the past month.  He describes pain in the lower substernal area.  It is sharp, stabbing pain.  His exercise tolerance is excellent.  He has been doing a lot of push-ups and CrossFit training.  He denies specific injury or trauma.    Denies shortness of breath, cough, sputum production.  He has mild acid reflux that is infrequent.      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  OBJECTIVE:     /84  Pulse 78  Ht 5' 11.5\" (1.816 m)  Wt 192 lb 9.6 oz (87.4 kg)  BMI 26.49 kg/m2  Body mass index is 26.49 kg/(m^2).  Physical Exam   Constitutional: He appears well-developed.   Neck: No thyromegaly present.   Cardiovascular: Normal rate and regular rhythm.    No murmur heard.  Pulmonary/Chest: Effort normal and breath sounds normal. No respiratory distress. He exhibits tenderness.   Musculoskeletal: He exhibits no edema.       Diagnostic Test Results:  Results for orders placed " or performed in visit on 06/29/18   EKG 12-lead complete w/read - Clinics    Narrative    EKG Interpretation:    Rhythm: Normal Sinus   Rate: 63  Axis: Normal  QRS interval: Normal  Conduction: Normal  ST Segments: Nonspecific T-wave abnormality in lead III with flattening in aVF, may be normal variant  MI: None  QTc interval: Normal  APC's Present: No  VPC's Present: No    Impression: Borderline EKG.  No comparison available.    Yamzin Rangel,   Internal Medicine         ASSESSMENT/PLAN:           ICD-10-CM    1. Atypical chest pain R07.89 EKG 12-lead complete w/read - Clinics     XR Chest 2 Views     **Lipid Profile FUTURE anytime   2. Mixed hyperlipidemia E78.2 **Lipid Profile FUTURE anytime     CANCELED: Lipid Profile     EKG was normal.  Chest x-ray appears normal.  He should follow-up for fasting lipid panel at his earliest convenience.  Pain is most consistent with musculoskeletal nature.  Recommend he avoid push-ups or other exercises that may strain the Costochondral joints.    He is comfortable with this plan on follow-up as needed.        Katie Craig MD  [facility] AND [facility]

## 2020-03-11 ENCOUNTER — HEALTH MAINTENANCE LETTER (OUTPATIENT)
Age: 35
End: 2020-03-11

## 2020-11-04 ENCOUNTER — ALLIED HEALTH/NURSE VISIT (OUTPATIENT)
Dept: FAMILY MEDICINE | Facility: OTHER | Age: 35
End: 2020-11-04
Payer: COMMERCIAL

## 2020-11-04 DIAGNOSIS — Z23 NEED FOR PROPHYLACTIC VACCINATION AND INOCULATION AGAINST INFLUENZA: Primary | ICD-10-CM

## 2020-11-04 PROCEDURE — 90471 IMMUNIZATION ADMIN: CPT

## 2020-11-04 PROCEDURE — 90686 IIV4 VACC NO PRSV 0.5 ML IM: CPT

## 2021-04-25 ENCOUNTER — HEALTH MAINTENANCE LETTER (OUTPATIENT)
Age: 36
End: 2021-04-25

## 2021-07-20 ENCOUNTER — OFFICE VISIT (OUTPATIENT)
Dept: PEDIATRICS | Facility: OTHER | Age: 36
End: 2021-07-20
Attending: INTERNAL MEDICINE
Payer: COMMERCIAL

## 2021-07-20 VITALS
SYSTOLIC BLOOD PRESSURE: 110 MMHG | WEIGHT: 202 LBS | DIASTOLIC BLOOD PRESSURE: 82 MMHG | RESPIRATION RATE: 16 BRPM | BODY MASS INDEX: 27.78 KG/M2 | TEMPERATURE: 98.5 F | OXYGEN SATURATION: 98 % | HEART RATE: 80 BPM

## 2021-07-20 DIAGNOSIS — D17.30 LIPOMA OF SKIN AND SUBCUTANEOUS TISSUE: Primary | ICD-10-CM
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
## 2021-07-20 DIAGNOSIS — H61.23 CERUMINOSIS, BILATERAL: ICD-10-CM

## 2021-07-20 DIAGNOSIS — H72.90 PERFORATION OF TYMPANIC MEMBRANE, UNSPECIFIED LATERALITY: ICD-10-CM

## 2021-07-20 PROCEDURE — 99214 OFFICE O/P EST MOD 30 MIN: CPT | Performed by: INTERNAL MEDICINE

## 2021-07-20 ASSESSMENT — PAIN SCALES - GENERAL: PAINLEVEL: NO PAIN (0)

## 2021-07-20 NOTE — NURSING NOTE
Chief Complaint   Patient presents with     Derm Problem       Patient presents to the clinic today for cyst on his back. It has been there for a while and it has changed and gotten bigger. He has a history of cysts in the past. He states it is painful and it gets caught on stuff while working out and playing with his one yr  Old daughter. Patient would also like his ears looked at he has a history of impaction. No other question or concern at this time.   FOOD SECURITY SCREENING QUESTIONS  Hunger Vital Signs:  Within the past 12 months we worried whether our food would run out before we got money to buy more. Never  Within the past 12 months the food we bought just didn't last and we didn't have money to get more. Never  Joanna Guevara LPN 7/20/2021 2:40 PM    Medication Reconciliation: complete

## 2021-07-20 NOTE — PROGRESS NOTES
Subjective   Santiago Moffett is a 35 year old male who presents for back lipoma pain and discomfort.  He has had a lipoma on his left upper back for a number of years.  It seems to have slowly enlarged.  Initially he thought it was a knot.  It is now painful especially with pushing on it.  He wonders if it can be removed.  The other night he awoke to care for his child and the dog had gone to the bathroom on the floor.  The cat was causing trouble so he picked up the cat to move the cat to another area and dropped the cat on his foot and was subsequently bit on the foot.  The area was red and painful but it is starting to get better.  He has had some hearing issues off and on in the past and is prone to earwax.  He uses earbuds a lot.  He thinks he has a perforated tympanic membrane but is not sure which one.    Objective   Vitals: /82   Pulse 80   Temp 98.5  F (36.9  C) (Tympanic)   Resp 16   Wt 91.6 kg (202 lb)   SpO2 98%   BMI 27.78 kg/m      HEENT: Tympanic membranes are obscured by cerumen bilaterally.  Cardiovascular: Regular rate and rhythm, no murmurs  Pulmonary: Lungs are clear  Skin: Puncture wound is visualized on the foot with slight surrounding erythema and no induration or purulent drainage.    Review and Analysis of Data   I personally reviewed the following:  External notes: No  Results: No  Use of an independent historian: No  Independent review of a test performed by another physician: No  Discussion of management with another physician: No  Moderate risk of morbidity from additional diagnostic testing and/or treatment.    Assessment & Plan   1. Lipoma of skin and subcutaneous tissue  I recommend a general surgery consultation to consider excisional biopsy.  I see no contraindication for surgery and anesthesia from an internal medicine perspective.  - Adult General Surg Referral; Future    2. Cat bite, initial encounter  We discussed the natural history of cat bites.  We discussed the  possibility of antibiotics but decided against it.  If it seems to be worsening I asked him to send us a picture and we would start a course of Augmentin.  Until then topical antibiotics and bandages.    3. Ceruminosis, bilateral  4. Perforation of tympanic membrane, unspecified laterality  With a history of a perforation I am leery about flushing or using Debrox so we do not introduce an infection into the middle ear space.  Given the tenacity appearance of his wax I do not think antibiotic drops would be effective enough for removal of the wax.  I recommend the ENT consultation for removal of the earwax.  - Otolaryngology Referral; Future      Signed, Cristian Elder MD, FAAP, FACP  Internal Medicine & Pediatrics

## 2021-07-23 ENCOUNTER — OFFICE VISIT (OUTPATIENT)
Dept: SURGERY | Facility: OTHER | Age: 36
End: 2021-07-23
Attending: SURGERY
Payer: COMMERCIAL

## 2021-07-23 VITALS
DIASTOLIC BLOOD PRESSURE: 72 MMHG | HEART RATE: 90 BPM | RESPIRATION RATE: 16 BRPM | BODY MASS INDEX: 27.66 KG/M2 | TEMPERATURE: 97.6 F | SYSTOLIC BLOOD PRESSURE: 120 MMHG | WEIGHT: 201.1 LBS | OXYGEN SATURATION: 99 %

## 2021-07-23 DIAGNOSIS — D17.30 LIPOMA OF SKIN AND SUBCUTANEOUS TISSUE: Primary | ICD-10-CM

## 2021-07-23 PROCEDURE — 21931 EXC BACK LES SC 3 CM/>: CPT | Performed by: SURGERY

## 2021-07-23 ASSESSMENT — PAIN SCALES - GENERAL: PAINLEVEL: NO PAIN (0)

## 2021-07-23 NOTE — PROGRESS NOTES
Procedure Note  Pre/Post Operative Diagnosis:   4 cm ( with margin ) lipoma left upper back    Procedure:    Excision    Surgeon: Fabian Deleon MD FACS    Local Anesthesia: 1% lidocaine with0.25%Marcaine with epinephrine    Indication for the procedure:    This is a 35 year old male patient  with  A fatty lump on left upper back.   After explaining the risks to include bleeding, infection, recurrence or need for re-excision,and scarring the patient wished to proceed.    Procedure:   The area was prepped and draped in usual sterile fashion with ChloraPrep . After, adequate local anesthesia,a skin incision was made over the lesion. The lipoma was completely removed in pieces. The subcutaneous tissue was approximated with 3-0 Vicryl sutures The skin was closed with 5-0 Maxon.  Proxi-strips and a clean dry dressing was applied. No specimen sent as typical lipoma and patient in agreement.    Plan:   Patient will followup if there any problems with the wound including redness or drainage.

## 2021-07-23 NOTE — PATIENT INSTRUCTIONS
Your incision was closed with stitches that will dissolve.    It is ok to remove the dressing and get the incision wet in the shower on the day after your procedure.     Don't soak in a tub, pool or lake for 1 week.  The small butterfly strips will start to peel off in 5-7 days it is ok to remove them. If you have concerns, please call.

## 2021-07-23 NOTE — NURSING NOTE
"Chief Complaint   Patient presents with     Derm Problem     lump on upper back       Initial /72 (BP Location: Left arm, Patient Position: Sitting, Cuff Size: Adult Large)   Pulse 90   Temp 97.6  F (36.4  C) (Tympanic)   Resp 16   Wt 91.2 kg (201 lb 1.6 oz)   SpO2 99%   BMI 27.66 kg/m   Estimated body mass index is 27.66 kg/m  as calculated from the following:    Height as of 6/29/18: 1.816 m (5' 11.5\").    Weight as of this encounter: 91.2 kg (201 lb 1.6 oz).  Medication Reconciliation: complete    Cristela Corona LPN     TIMEOUT  [facility] Protocol    A. Pre-procedure verification complete     1-relevant information / documentation available, reviewed and properly matched to the patient; 2-consent accurate and complete, 3-equipment and supplies available    B. Site marking complete     Site marked if not in continuous attendance with patient    C. TIME OUT completed       Time Out was conducted just prior to starting procedure to verify the eight required elements: 1-patient identity, 2-consent accurate and complete, 3-position, 4-correct side/site marked (if applicable), 5-procedure, 6-relevant images / results properly labeled and displayed (if applicable), 7-antibiotics / irrigation fluids (if applicable), 8-safety precautions.  "

## 2021-08-17 ENCOUNTER — OFFICE VISIT (OUTPATIENT)
Dept: OTOLARYNGOLOGY | Facility: OTHER | Age: 36
End: 2021-08-17
Attending: OTOLARYNGOLOGY
Payer: COMMERCIAL

## 2021-08-17 DIAGNOSIS — H61.23 BILATERAL IMPACTED CERUMEN: Primary | ICD-10-CM

## 2021-08-17 PROCEDURE — G0463 HOSPITAL OUTPT CLINIC VISIT: HCPCS

## 2021-08-22 ENCOUNTER — ALLIED HEALTH/NURSE VISIT (OUTPATIENT)
Dept: FAMILY MEDICINE | Facility: OTHER | Age: 36
End: 2021-08-22
Attending: FAMILY MEDICINE
Payer: COMMERCIAL

## 2021-08-22 DIAGNOSIS — J02.9 SORE THROAT: Primary | ICD-10-CM

## 2021-08-22 LAB — SARS-COV-2 RNA RESP QL NAA+PROBE: NEGATIVE

## 2021-08-22 PROCEDURE — U0005 INFEC AGEN DETEC AMPLI PROBE: HCPCS | Mod: ZL

## 2021-08-22 PROCEDURE — C9803 HOPD COVID-19 SPEC COLLECT: HCPCS

## 2021-08-24 NOTE — PROGRESS NOTES
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  He has had a lot of eustachian tube difficulties in the past.  He has had tubes as a child.     Exam  The external auditory canals are completely occluded with cerumen bilaterally.  The material was cleared revealing healthy appearing intact tympanic membranes.  Tuning fork responses are normal  Remainder of the head neck exam is unremarkable    Home Medications     - Last Reconciled 08/18/21 by [facility]    No Home Medications        Allergies    ciprofloxacin Allergy (Unverified 08/18/21 12:02)  Unknown    PFSH  PFSH:     Medical History (Updated 08/18/21 @ 12:02 by [facility])    Ear pressure     Surgical History (Updated 08/18/21 @ 12:02 by [facility])    History of placement of ear tubes     Family History (Updated 08/18/21 @ 12:02 by [facility] Assist)  Other  Cancer  Hearing loss  Hypertension       Social History (Updated 08/18/21 @ 12:02 by [facility])  Smoking Status:  Former smoker  second hand exposure:  No       A&P  Assessment & Plan  (1) Impacted cerumen, bilateral:        Status: Acute        Code(s):  H61.23 - Impacted cerumen, bilateral  Additional Plan Details  Plan Details: The patient was counseled regarding management of his ears moving forward.      Buddy Beck MD    08/17/21 0834    <Electronically signed by Buddy Beck MD> 08/18/21 8398

## 2021-10-09 ENCOUNTER — HEALTH MAINTENANCE LETTER (OUTPATIENT)
Age: 36
End: 2021-10-09

## 2021-10-18 ENCOUNTER — ALLIED HEALTH/NURSE VISIT (OUTPATIENT)
Dept: FAMILY MEDICINE | Facility: OTHER | Age: 36
End: 2021-10-18
Attending: FAMILY MEDICINE
Payer: COMMERCIAL

## 2021-10-18 DIAGNOSIS — Z20.822 COVID-19 RULED OUT: Primary | ICD-10-CM

## 2021-10-18 PROCEDURE — U0005 INFEC AGEN DETEC AMPLI PROBE: HCPCS | Mod: ZL

## 2021-10-18 PROCEDURE — C9803 HOPD COVID-19 SPEC COLLECT: HCPCS

## 2021-10-19 LAB — SARS-COV-2 RNA RESP QL NAA+PROBE: NEGATIVE

## 2022-05-21 ENCOUNTER — HEALTH MAINTENANCE LETTER (OUTPATIENT)
Age: 37
End: 2022-05-21

## 2022-09-17 ENCOUNTER — HEALTH MAINTENANCE LETTER (OUTPATIENT)
Age: 37
End: 2022-09-17

## 2023-06-04 ENCOUNTER — HEALTH MAINTENANCE LETTER (OUTPATIENT)
Age: 38
End: 2023-06-04

## 2023-10-11 ENCOUNTER — ALLIED HEALTH/NURSE VISIT (OUTPATIENT)
Dept: FAMILY MEDICINE | Facility: OTHER | Age: 38
End: 2023-10-11
Payer: COMMERCIAL

## 2023-10-11 DIAGNOSIS — Z23 NEED FOR PROPHYLACTIC VACCINATION AND INOCULATION AGAINST INFLUENZA: Primary | ICD-10-CM

## 2023-10-11 DIAGNOSIS — Z23 HIGH PRIORITY FOR 2019-NCOV VACCINE: ICD-10-CM

## 2023-10-11 PROCEDURE — 90471 IMMUNIZATION ADMIN: CPT

## 2023-10-11 PROCEDURE — 90686 IIV4 VACC NO PRSV 0.5 ML IM: CPT

## 2023-10-11 PROCEDURE — 90480 ADMN SARSCOV2 VAC 1/ONLY CMP: CPT

## 2023-10-11 PROCEDURE — 91320 SARSCV2 VAC 30MCG TRS-SUC IM: CPT

## 2023-10-11 NOTE — PROGRESS NOTES
We are scheduling all people aged 6 months and older for updated 9439-7112 COVID-19 vaccines.  [facility] will stock Pfizer COVID-19 vaccines in the clinics.  Patients who are up to date with COVID-19 vaccine will only need a single dose of the updated 9069-1758 vaccine, at least 8 weeks after last dose. Unvaccinated patients or those not up to date may have a different dosing schedule.  To schedule a COVID-19 vaccine appointment, please log in to EverythingMe using this link to see when and where we have openings (to schedule a child s vaccine, you will need to log into their EverythingMe account).     [facility] retail pharmacies also administer Pfizer COVID Vaccines to patients 5 years and older. [facility] offer Novavax primary COVID-19 vaccine.  To schedule at a [facility] pharmacy please go to [email or web address] Location, [email or web address]".[email or web address].    To learn more about COVID-19 vaccines in general, please visit the CDC website: https://www.cdc.gov/coronavirus/2019-ncov/vaccines/index.html     If you have technical difficulty using EverythingMe, call [phone], option 1 for assistance.    More information about vaccine effectiveness at reducing spread of disease, hospitalizations, and death as well as vaccine safety and answers to other questions can be found on our website: [email or web address]    Etta Cruz LPN on 10/11/2023 at 1:48 PM

## 2024-08-22 SDOH — HEALTH STABILITY: PHYSICAL HEALTH: ON AVERAGE, HOW MANY MINUTES DO YOU ENGAGE IN EXERCISE AT THIS LEVEL?: 60 MIN

## 2024-08-22 SDOH — HEALTH STABILITY: PHYSICAL HEALTH: ON AVERAGE, HOW MANY DAYS PER WEEK DO YOU ENGAGE IN MODERATE TO STRENUOUS EXERCISE (LIKE A BRISK WALK)?: 5 DAYS

## 2024-08-22 ASSESSMENT — SOCIAL DETERMINANTS OF HEALTH (SDOH): HOW OFTEN DO YOU GET TOGETHER WITH FRIENDS OR RELATIVES?: ONCE A WEEK

## 2024-08-27 ENCOUNTER — OFFICE VISIT (OUTPATIENT)
Dept: FAMILY MEDICINE | Facility: OTHER | Age: 39
End: 2024-08-27
Attending: NURSE PRACTITIONER
Payer: COMMERCIAL

## 2024-08-27 VITALS
HEIGHT: 72 IN | OXYGEN SATURATION: 98 % | RESPIRATION RATE: 16 BRPM | BODY MASS INDEX: 26.98 KG/M2 | WEIGHT: 199.2 LBS | DIASTOLIC BLOOD PRESSURE: 82 MMHG | SYSTOLIC BLOOD PRESSURE: 128 MMHG | HEART RATE: 92 BPM

## 2024-08-27 DIAGNOSIS — H61.23 BILATERAL IMPACTED CERUMEN: ICD-10-CM

## 2024-08-27 DIAGNOSIS — Z13.220 LIPID SCREENING: ICD-10-CM

## 2024-08-27 DIAGNOSIS — Z00.00 ROUTINE GENERAL MEDICAL EXAMINATION AT A HEALTH CARE FACILITY: Primary | ICD-10-CM

## 2024-08-27 LAB
ALBUMIN SERPL BCG-MCNC: 5 G/DL (ref 3.5–5.2)
ALP SERPL-CCNC: 59 U/L (ref 40–150)
ALT SERPL W P-5'-P-CCNC: 23 U/L (ref 0–70)
ANION GAP SERPL CALCULATED.3IONS-SCNC: 12 MMOL/L (ref 7–15)
AST SERPL W P-5'-P-CCNC: 20 U/L (ref 0–45)
BILIRUB SERPL-MCNC: 0.3 MG/DL
BUN SERPL-MCNC: 22.4 MG/DL (ref 6–20)
CALCIUM SERPL-MCNC: 9.4 MG/DL (ref 8.8–10.4)
CHLORIDE SERPL-SCNC: 102 MMOL/L (ref 98–107)
CHOLEST SERPL-MCNC: 246 MG/DL
CREAT SERPL-MCNC: 1.01 MG/DL (ref 0.67–1.17)
EGFRCR SERPLBLD CKD-EPI 2021: >90 ML/MIN/1.73M2
FASTING STATUS PATIENT QL REPORTED: ABNORMAL
FASTING STATUS PATIENT QL REPORTED: ABNORMAL
GLUCOSE SERPL-MCNC: 95 MG/DL (ref 70–99)
HCO3 SERPL-SCNC: 25 MMOL/L (ref 22–29)
HDLC SERPL-MCNC: 41 MG/DL
LDLC SERPL CALC-MCNC: 159 MG/DL
NONHDLC SERPL-MCNC: 205 MG/DL
POTASSIUM SERPL-SCNC: 3.9 MMOL/L (ref 3.4–5.3)
PROT SERPL-MCNC: 7.6 G/DL (ref 6.4–8.3)
SODIUM SERPL-SCNC: 139 MMOL/L (ref 135–145)
TRIGL SERPL-MCNC: 231 MG/DL

## 2024-08-27 PROCEDURE — 99213 OFFICE O/P EST LOW 20 MIN: CPT | Mod: 25 | Performed by: NURSE PRACTITIONER

## 2024-08-27 PROCEDURE — 36415 COLL VENOUS BLD VENIPUNCTURE: CPT | Mod: ZL | Performed by: NURSE PRACTITIONER

## 2024-08-27 PROCEDURE — 69209 REMOVE IMPACTED EAR WAX UNI: CPT | Performed by: NURSE PRACTITIONER

## 2024-08-27 PROCEDURE — 99395 PREV VISIT EST AGE 18-39: CPT | Performed by: NURSE PRACTITIONER

## 2024-08-27 PROCEDURE — 80061 LIPID PANEL: CPT | Mod: ZL | Performed by: NURSE PRACTITIONER

## 2024-08-27 PROCEDURE — 80053 COMPREHEN METABOLIC PANEL: CPT | Mod: ZL | Performed by: NURSE PRACTITIONER

## 2024-08-27 NOTE — PATIENT INSTRUCTIONS
Patient Education   Preventive Care Advice   This is general advice given by our system to help you stay healthy. However, your care team may have specific advice just for you. Please talk to your care team about your preventive care needs.  Nutrition  Eat 5 or more servings of fruits and vegetables each day.  Try wheat bread, brown rice and whole grain pasta (instead of white bread, rice, and pasta).  Get enough calcium and vitamin D. Check the label on foods and aim for 100% of the RDA (recommended daily allowance).  Lifestyle  Exercise at least 150 minutes each week  (30 minutes a day, 5 days a week).  Do muscle strengthening activities 2 days a week. These help control your weight and prevent disease.  No smoking.  Wear sunscreen to prevent skin cancer.  Have a dental exam and cleaning every 6 months.  Yearly exams  See your health care team every year to talk about:  Any changes in your health.  Any medicines your care team has prescribed.  Preventive care, family planning, and ways to prevent chronic diseases.  Shots (vaccines)   HPV shots (up to age 26), if you've never had them before.  Hepatitis B shots (up to age 59), if you've never had them before.  COVID-19 shot: Get this shot when it's due.  Flu shot: Get a flu shot every year.  Tetanus shot: Get a tetanus shot every 10 years.  Pneumococcal, hepatitis A, and RSV shots: Ask your care team if you need these based on your risk.  Shingles shot (for age 50 and up)  General health tests  Diabetes screening:  Starting at age 35, Get screened for diabetes at least every 3 years.  If you are younger than age 35, ask your care team if you should be screened for diabetes.  Cholesterol test: At age 39, start having a cholesterol test every 5 years, or more often if advised.  Bone density scan (DEXA): At age 50, ask your care team if you should have this scan for osteoporosis (brittle bones).  Hepatitis C: Get tested at least once in your life.  STIs (sexually  transmitted infections)  Before age 24: Ask your care team if you should be screened for STIs.  After age 24: Get screened for STIs if you're at risk. You are at risk for STIs (including HIV) if:  You are sexually active with more than one person.  You don't use condoms every time.  You or a partner was diagnosed with a sexually transmitted infection.  If you are at risk for HIV, ask about PrEP medicine to prevent HIV.  Get tested for HIV at least once in your life, whether you are at risk for HIV or not.  Cancer screening tests  Cervical cancer screening: If you have a cervix, begin getting regular cervical cancer screening tests starting at age 21.  Breast cancer scan (mammogram): If you've ever had breasts, begin having regular mammograms starting at age 40. This is a scan to check for breast cancer.  Colon cancer screening: It is important to start screening for colon cancer at age 45.  Have a colonoscopy test every 10 years (or more often if you're at risk) Or, ask your provider about stool tests like a FIT test every year or Cologuard test every 3 years.  To learn more about your testing options, visit:   .  For help making a decision, visit:   https://bit.ly/wl19701.  Prostate cancer screening test: If you have a prostate, ask your care team if a prostate cancer screening test (PSA) at age 55 is right for you.  Lung cancer screening: If you are a current or former smoker ages 50 to 80, ask your care team if ongoing lung cancer screenings are right for you.  For informational purposes only. Not to replace the advice of your health care provider. Copyright   2023 [employer]. All rights reserved. Clinically reviewed by the [facility] Transitions Program. [employer] 428821 - REV 01/24.

## 2024-08-27 NOTE — PROGRESS NOTES
"Preventive Care Visit  [facility]  ANDREY Gutierrez CNP, Nurse Practitioner - Family  Aug 27, 2024      Assessment & Plan   Problem List Items Addressed This Visit    None  Visit Diagnoses       Routine general medical examination at a health care facility    -  Primary    Relevant Orders    Comprehensive Metabolic Panel (Completed)    Lipid screening        Relevant Orders    Lipid Panel (Completed)    Bilateral impacted cerumen        Relevant Orders    ZZHC REMOVAL IMPACTED CERUMEN IRRIGATION/LVG UNILAT (Completed)           Routine screening labs including lipid panel and CMP-lipid panel elevated, not fasting. BUN slight elevated. Education via [employer] sent. Plan to recheck these again in 3-6 months.   Ear flush completed by nursing, tolerated well.  Discussed symptomatic management as well as signs and symptoms that would warrant follow-up.  Routine ear flush as needed  Up-to-date on immunizations  Follow-up annually and as needed    Patient has been advised of split billing requirements and indicates understanding: Yes       BMI  Estimated body mass index is 27.4 kg/m  as calculated from the following:    Height as of this encounter: 1.816 m (5' 11.5\").    Weight as of this encounter: 90.4 kg (199 lb 3.2 oz).   Weight management plan: Discussed healthy diet and exercise guidelines    Counseling  Appropriate preventive services were addressed with this patient via screening, questionnaire, or discussion as appropriate for fall prevention, nutrition, physical activity, Tobacco-use cessation, social engagement, weight loss and cognition.  Checklist reviewing preventive services available has been given to the patient.  Reviewed patient's diet, addressing concerns and/or questions.         Return in about 53 weeks (around 9/2/2025) for Annual Wellness Visit.      Jaime Henderson is a 38 year old, presenting for the following:  Physical         Health Care Directive  Patient does not have " a Health Care Directive or Living Will:     HPI  Comes in today for annual wellness exam.  His only concern is for decreased hearing in bilateral ears, history of impacted earwax, like to have these flushed.  He denies any other health concerns today.          8/22/2024   General Health   How would you rate your overall physical health? (!) FAIR   Feel stress (tense, anxious, or unable to sleep) To some extent      (!) STRESS CONCERN      8/22/2024   Nutrition   Three or more servings of calcium each day? Yes   Diet: Gluten-free/reduced   How many servings of fruit and vegetables per day? (!) 2-3   How many sweetened beverages each day? 0-1            8/22/2024   Exercise   Days per week of moderate/strenous exercise 5 days   Average minutes spent exercising at this level 60 min            8/22/2024   Social Factors   Frequency of gathering with friends or relatives Once a week   Worry food won't last until get money to buy more No   Food not last or not have enough money for food? No   Do you have housing? (Housing is defined as stable permanent housing and does not include staying ouside in a car, in a tent, in an abandoned building, in an overnight shelter, or couch-surfing.) Yes   Are you worried about losing your housing? No   Lack of transportation? No   Unable to get utilities (heat,electricity)? No            8/22/2024   Dental   Dentist two times every year? Yes            8/22/2024   TB Screening   Were you born outside of the [place]? No            Today's PHQ-2 Score:       8/26/2024     3:58 PM   PHQ-2 ( 1999 [employer])   Q1: Little interest or pleasure in doing things 0   Q2: Feeling down, depressed or hopeless 0   PHQ-2 Score 0   Q1: Little interest or pleasure in doing things Not at all   Q2: Feeling down, depressed or hopeless Not at all   PHQ-2 Score 0           8/22/2024   Substance Use   Alcohol more than 3/day or more than 7/wk No   Do you use any other substances recreationally? No        Social  History     Tobacco Use    Smoking status: Former     Types: Cigarettes    Smokeless tobacco: Never   Vaping Use    Vaping status: Never Used   Substance Use Topics    Alcohol use: Yes     Comment: Alcoholic Drinks/day: infrequent social    Drug use: No     Comment: Drug use: No             8/22/2024   One time HIV Screening   Previous HIV test? No          8/22/2024   STI Screening   New sexual partner(s) since last STI/HIV test? 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5    TYMPANOSTOMY, LOCAL/TOPICAL ANESTHESIA      left ear hard of hearing     Patient Active Problem List   Diagnosis    Gluten intolerance    Non-seasonal allergic rhinitis due to pollen     Past Surgical History:   Procedure Laterality Date    ARTHROSCOPIC RECONSTRUCTION ANTERIOR CRUCIATE LIGAMENT      at age 18    OTHER SURGICAL HISTORY      [id number],FETAL SURGERY FOR CONGENITAL HERNIA,Left,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     Allergies   Allergen Reactions    Ciprofloxacin Rash     Breathing issues            Objective    Exam  /82   Pulse 92   Resp 16   Ht 1.816 m (5' 11.5\")   Wt 90.4 kg (199 lb 3.2 oz)   SpO2 98%   BMI 27.40 kg/m     Estimated body mass index is 27.4 kg/m  as calculated from the following:    Height as of this encounter: 1.816 m (5' 11.5\").    Weight as of this encounter: 90.4 kg (199 lb 3.2 oz).    Physical Exam  GENERAL: alert and no distress  EYES: Eyes grossly normal to inspection, PERRL and conjunctivae and sclerae normal  HENT: ear canals and TM's normal, nose and mouth without ulcers or lesions. Impacted wax flushed by nursing and tolerated well  NECK: no adenopathy, no asymmetry, masses, or scars  RESP: lungs clear to auscultation - no rales, rhonchi or wheezes  CV: regular rate and rhythm, normal S1 S2, no S3 or S4, no murmur, click or rub, no peripheral edema  ABDOMEN: soft, nontender, no hepatosplenomegaly, no masses and bowel sounds normal  MS: no gross musculoskeletal defects noted, no edema  SKIN: no suspicious lesions or rashes  NEURO: Normal strength and tone, mentation intact and speech normal  PSYCH: mentation appears normal, affect normal/bright        Signed Electronically by: ANDREY Gutierrez CNP    "

## 2024-08-27 NOTE — NURSING NOTE
Patient presents today for annual physical. Patient complains his ear is plugged.    Medication Reconciliation Complete    Samantha Hugo LPN  8/27/2024 3:14 PM

## 2024-09-18 ENCOUNTER — HOSPITAL ENCOUNTER (OUTPATIENT)
Dept: GENERAL RADIOLOGY | Facility: OTHER | Age: 39
Discharge: HOME | End: 2024-09-18
Attending: NURSE PRACTITIONER
Payer: COMMERCIAL

## 2024-09-18 ENCOUNTER — OFFICE VISIT (OUTPATIENT)
Dept: FAMILY MEDICINE | Facility: OTHER | Age: 39
End: 2024-09-18
Attending: NURSE PRACTITIONER
Payer: COMMERCIAL

## 2024-09-18 VITALS
TEMPERATURE: 98.1 F | OXYGEN SATURATION: 96 % | HEIGHT: 72 IN | WEIGHT: 196 LBS | DIASTOLIC BLOOD PRESSURE: 62 MMHG | BODY MASS INDEX: 26.55 KG/M2 | HEART RATE: 107 BPM | SYSTOLIC BLOOD PRESSURE: 118 MMHG | RESPIRATION RATE: 20 BRPM

## 2024-09-18 DIAGNOSIS — R50.9 FEVER, UNSPECIFIED FEVER CAUSE: ICD-10-CM

## 2024-09-18 DIAGNOSIS — R05.1 ACUTE COUGH: ICD-10-CM

## 2024-09-18 DIAGNOSIS — J18.9 PNEUMONIA OF LEFT LOWER LOBE DUE TO INFECTIOUS ORGANISM: Primary | ICD-10-CM

## 2024-09-18 DIAGNOSIS — R73.09 ELEVATED GLUCOSE: ICD-10-CM

## 2024-09-18 LAB
ANION GAP SERPL CALCULATED.3IONS-SCNC: 11 MMOL/L (ref 7–15)
BASOPHILS # BLD AUTO: 0 10E3/UL (ref 0–0.2)
BASOPHILS NFR BLD AUTO: 1 %
BUN SERPL-MCNC: 8.8 MG/DL (ref 6–20)
CALCIUM SERPL-MCNC: 9.2 MG/DL (ref 8.8–10.4)
CHLORIDE SERPL-SCNC: 102 MMOL/L (ref 98–107)
CREAT SERPL-MCNC: 0.94 MG/DL (ref 0.67–1.17)
EGFRCR SERPLBLD CKD-EPI 2021: >90 ML/MIN/1.73M2
EOSINOPHIL # BLD AUTO: 0.1 10E3/UL (ref 0–0.7)
EOSINOPHIL NFR BLD AUTO: 1 %
ERYTHROCYTE [DISTWIDTH] IN BLOOD BY AUTOMATED COUNT: 12.3 % (ref 10–15)
EST. AVERAGE GLUCOSE BLD GHB EST-MCNC: 103 MG/DL
GLUCOSE SERPL-MCNC: 133 MG/DL (ref 70–99)
HBA1C MFR BLD: 5.2 %
HCO3 SERPL-SCNC: 26 MMOL/L (ref 22–29)
HCT VFR BLD AUTO: 40.7 % (ref 40–53)
HGB BLD-MCNC: 14 G/DL (ref 13.3–17.7)
IMM GRANULOCYTES # BLD: 0 10E3/UL
IMM GRANULOCYTES NFR BLD: 0 %
LYMPHOCYTES # BLD AUTO: 1 10E3/UL (ref 0.8–5.3)
LYMPHOCYTES NFR BLD AUTO: 15 %
MCH RBC QN AUTO: 28 PG (ref 26.5–33)
MCHC RBC AUTO-ENTMCNC: 34.4 G/DL (ref 31.5–36.5)
MCV RBC AUTO: 81 FL (ref 78–100)
MONOCYTES # BLD AUTO: 0.9 10E3/UL (ref 0–1.3)
MONOCYTES NFR BLD AUTO: 14 %
NEUTROPHILS # BLD AUTO: 4.6 10E3/UL (ref 1.6–8.3)
NEUTROPHILS NFR BLD AUTO: 70 %
NRBC # BLD AUTO: 0 10E3/UL
NRBC BLD AUTO-RTO: 0 /100
PLATELET # BLD AUTO: 204 10E3/UL (ref 150–450)
POTASSIUM SERPL-SCNC: 3.6 MMOL/L (ref 3.4–5.3)
RBC # BLD AUTO: 5 10E6/UL (ref 4.4–5.9)
SARS-COV-2 RNA RESP QL NAA+PROBE: NEGATIVE
SODIUM SERPL-SCNC: 139 MMOL/L (ref 135–145)
WBC # BLD AUTO: 6.5 10E3/UL (ref 4–11)

## 2024-09-18 PROCEDURE — 87635 SARS-COV-2 COVID-19 AMP PRB: CPT | Mod: ZL | Performed by: NURSE PRACTITIONER

## 2024-09-18 PROCEDURE — 83036 HEMOGLOBIN GLYCOSYLATED A1C: CPT | Mod: ZL | Performed by: NURSE PRACTITIONER

## 2024-09-18 PROCEDURE — G2211 COMPLEX E/M VISIT ADD ON: HCPCS | Performed by: NURSE PRACTITIONER

## 2024-09-18 PROCEDURE — 99214 OFFICE O/P EST MOD 30 MIN: CPT | Performed by: NURSE PRACTITIONER

## 2024-09-18 PROCEDURE — 36415 COLL VENOUS BLD VENIPUNCTURE: CPT | Mod: ZL | Performed by: NURSE PRACTITIONER

## 2024-09-18 PROCEDURE — 71046 X-RAY EXAM CHEST 2 VIEWS: CPT

## 2024-09-18 PROCEDURE — 85025 COMPLETE CBC W/AUTO DIFF WBC: CPT | Mod: ZL | Performed by: NURSE PRACTITIONER

## 2024-09-18 PROCEDURE — 80048 BASIC METABOLIC PNL TOTAL CA: CPT | Mod: ZL | Performed by: NURSE PRACTITIONER

## 2024-09-18 RX ORDER — DOXYCYCLINE 100 MG/1
100 CAPSULE ORAL 2 TIMES DAILY
Qty: 20 CAPSULE | Refills: 0 | Status: SHIPPED | OUTPATIENT
Start: 2024-09-18 | End: 2024-09-28

## 2024-09-18 ASSESSMENT — PAIN SCALES - GENERAL: PAINLEVEL: MODERATE PAIN (4)

## 2024-09-18 ASSESSMENT — ENCOUNTER SYMPTOMS: FEVER: 1

## 2024-09-18 NOTE — NURSING NOTE
Patient is here for concerns of fevers. Started Saturday, going up and down, using OTC to help. 98.3-102.9, mostly 101ish. Some difficulty breathing, started Sunday.       Medication Reconciliation: complete    Petty Wolfe LPN 9/18/2024 10:26 AM       Advance care directive on file? no  Advance care directive provided to patient? no       Petty Wolfe LPN

## 2024-09-18 NOTE — PROGRESS NOTES
Assessment & Plan   Problem List Items Addressed This Visit    None  Visit Diagnoses       Pneumonia of left lower lobe due to infectious organism    -  Primary    Relevant Medications    doxycycline hyclate (VIBRAMYCIN) 100 MG capsule    Other Relevant Orders    Other Respiratory Supplies for DME - ONLY FOR DME    Acute cough        Relevant Orders    CBC and Differential (Completed)    XR Chest 2 Views (Completed)    Symptomatic COVID-19 Virus (Coronavirus) by PCR Nose (Completed)    Basic Metabolic Panel (Completed)    Fever, unspecified fever cause        Relevant Orders    CBC and Differential (Completed)    XR Chest 2 Views (Completed)    Symptomatic COVID-19 Virus (Coronavirus) by PCR Nose (Completed)    Basic Metabolic Panel (Completed)    Elevated glucose        Relevant Orders    Hemoglobin A1c (Completed)             Updated including CBC, BMP and COVID testing.  COVID test was negative, CBC was stable.  Basic metabolic panel did show elevated glucose, A1c was normal.  Chest x-ray was completed, reviewed by myself showing left lower lobe pneumonia, over read by radiology and confirmed.  Treat doxycycline, given incentive spirometer.  Discussed signs and symptoms would warrant follow-up.     Review of the result(s) of each unique test - labs, CXR  Ordering of each unique test  Prescription drug management    The longitudinal plan of care for the diagnosis(es)/condition(s) as documented were addressed during this visit. Due to the added complexity in care, I will continue to support Santiago in the subsequent management and with ongoing continuity of care.    No follow-ups on file.      Subjective   Santiago is a 38 year old, presenting for the following health issues:  Fever      9/18/2024    10:22 AM   Additional Questions   Roomed by Petty torres     History of Present Illness       Headaches:   Since the patient's last clinic visit, headaches are: worsened  The patient is getting headaches:  3-4  He is not able  "to do normal daily activities when he has a migraine.  The patient is taking the following rescue/relief medications:  Tylenol   Patient states \"The relief is inconsistent\" from the rescue/relief medications.   The patient is taking the following medications to prevent migraines:  No medications to prevent migraines  In the past 4 weeks, the patient has gone to an Urgent Care or Emergency Room 0 times times due to headaches.    Reason for visit:  Fevers since Sunday  Symptom onset:  3-7 days ago  Symptoms include:  Chills, shakes, headaches, body aches  Symptom intensity:  Moderate  Symptom progression:  Worsening  Had these symptoms before:  No   He is taking medications regularly.     He presents to clinic today for concerns about fevers and shortness of breath.  He reports he started with some cold symptoms on Friday, wife and kids have also had similar symptoms.  Saturday he had chills and fever, Sunday had worsening symptoms, cough, sinus congestion.  Cough seems to be worsening, increased shortness of breath with minimal activity, normally he is very active.  He has been using over-the-counter Tylenol as well as cough and cold medications, this relieves fever for about 3 hours.  The last couple days he feels symptoms seem to be a little better in the morning but worse throughout the day.  He has done home COVID testing on Monday and today which have been negative.  No history of asthma or COPD.          Objective    /62   Pulse 107   Temp 98.1  F (36.7  C) (Tympanic)   Resp 20   Ht 1.816 m (5' 11.5\")   Wt 88.9 kg (196 lb)   SpO2 96%   BMI 26.96 kg/m    Body mass index is 26.96 kg/m .  Physical Exam   GENERAL: alert, mildly ill but nontoxic  EYES: Eyes grossly normal to inspection, PERRL and conjunctivae and sclerae normal  HENT: ear canals and TM's normal, nose and mouth without ulcers or lesions  NECK: no adenopathy, no asymmetry, masses, or scars  RESP: lungs clear to auscultation - no rales, " rhonchi or wheezes, frequent cough, appears SOB but is able to talk in full sentences. O2 sats 96% on RA  CV: regular rate and rhythm, normal S1 S2  NEURO: Normal strength and tone, mentation intact and speech normal  PSYCH: mentation appears normal, affect normal/bright    Results for orders placed or performed during the hospital encounter of 09/18/24   XR Chest 2 Views     Status: None    Narrative    Exam:  XR CHEST 2 VIEWS    HISTORY: Acute cough; Fever, unspecified fever cause.    COMPARISON:  6/29/2018    FINDINGS:     The cardiomediastinal contours are normal.      There is consolidation with air bronchograms in the left lower lobe.  No pleural effusion or pneumothorax.      No acute osseous abnormality.       Impression    IMPRESSION:      Left lower lobe consolidation compatible with pneumonia.      MARIA DEL CARMEN CLAVERT MD         SYSTEM ID:  [id number]   Results for orders placed or performed in visit on 09/18/24   Symptomatic COVID-19 Virus (Coronavirus) by PCR Nose     Status: Normal    Specimen: Nose; Swab   Result Value Ref Range    SARS CoV2 PCR Negative Negative    Narrative    Testing was performed using the Xpert Xpress SARS-CoV-2 Assay on the Cepheid Gene-Xpert Instrument Systems. Additional information about this assay can be found via the Test Directory. This [place] FDA cleared test should be ordered for the detection of SARS-CoV-2 in individuals with signs and symptoms of respiratory tract infection. This test is for in vitro diagnostic use under the [place] FDA for laboratories certified under CLIA to perform high complexity testing. A negative result does not rule out the presence of PCR inhibitors in the specimen or target RNA concentration below the limit of detection for the assay. The possibility of a false negative should be considered if the patient's recent exposure or clinical presentation suggests COVID-19. This test was validated by [facility]. These Laboratories are certified  under the  Clinical Laboratory Improvement Amendments (CLIA) as qualified to perform high complexity testing.   Basic Metabolic Panel     Status: Abnormal   Result Value Ref Range    Sodium 139 135 - 145 mmol/L    Potassium 3.6 3.4 - 5.3 mmol/L    Chloride 102 98 - 107 mmol/L    Carbon Dioxide (CO2) 26 22 - 29 mmol/L    Anion Gap 11 7 - 15 mmol/L    Urea Nitrogen 8.8 6.0 - 20.0 mg/dL    Creatinine 0.94 0.67 - 1.17 mg/dL    GFR Estimate >90 >60 mL/min/1.73m2    Calcium 9.2 8.8 - 10.4 mg/dL    Glucose 133 (H) 70 - 99 mg/dL   CBC with platelets and differential     Status: None   Result Value Ref Range    WBC Count 6.5 4.0 - 11.0 10e3/uL    RBC Count 5.00 4.40 - 5.90 10e6/uL    Hemoglobin 14.0 13.3 - 17.7 g/dL    Hematocrit 40.7 40.0 - 53.0 %    MCV 81 78 - 100 fL    MCH 28.0 26.5 - 33.0 pg    MCHC 34.4 31.5 - 36.5 g/dL    RDW 12.3 10.0 - 15.0 %    Platelet Count 204 150 - 450 10e3/uL    % Neutrophils 70 %    % Lymphocytes 15 %    % Monocytes 14 %    % Eosinophils 1 %    % Basophils 1 %    % Immature Granulocytes 0 %    NRBCs per 100 WBC 0 <1 /100    Absolute Neutrophils 4.6 1.6 - 8.3 10e3/uL    Absolute Lymphocytes 1.0 0.8 - 5.3 10e3/uL    Absolute Monocytes 0.9 0.0 - 1.3 10e3/uL    Absolute Eosinophils 0.1 0.0 - 0.7 10e3/uL    Absolute Basophils 0.0 0.0 - 0.2 10e3/uL    Absolute Immature Granulocytes 0.0 <=0.4 10e3/uL    Absolute NRBCs 0.0 10e3/uL   Hemoglobin A1c     Status: Normal   Result Value Ref Range    Estimated Average Glucose 103 <117 mg/dL    Hemoglobin A1C 5.2 <5.7 %   CBC and Differential     Status: None    Narrative    The following orders were created for panel order CBC and Differential.  Procedure                               Abnormality         Status                     ---------                               -----------         ------                     CBC with platelets and d...[[id number]]                      Final result                 Please view results for these tests on the individual  orders.             Signed Electronically by: ANDREY Gutierrez CNP

## 2024-10-23 ENCOUNTER — IMMUNIZATION (OUTPATIENT)
Dept: FAMILY MEDICINE | Facility: OTHER | Age: 39
End: 2024-10-23
Attending: NURSE PRACTITIONER
Payer: COMMERCIAL

## 2024-10-23 DIAGNOSIS — Z23 NEED FOR PROPHYLACTIC VACCINATION AND INOCULATION AGAINST INFLUENZA: Primary | ICD-10-CM

## 2024-10-23 DIAGNOSIS — Z23 HIGH PRIORITY FOR 2019-NCOV VACCINE: ICD-10-CM

## 2024-10-23 PROCEDURE — 90480 ADMN SARSCOV2 VAC 1/ONLY CMP: CPT

## 2024-10-23 PROCEDURE — 90471 IMMUNIZATION ADMIN: CPT

## 2024-10-23 PROCEDURE — 90656 IIV3 VACC NO PRSV 0.5 ML IM: CPT

## 2024-10-23 PROCEDURE — 91320 SARSCV2 VAC 30MCG TRS-SUC IM: CPT

## 2025-03-23 NOTE — NURSING NOTE
Patient Information     Patient Name MRN Sex Santiago Prado [id number] Male 1985      Nursing Note by Milady Banuelos at 2018  3:00 PM     Author:  Milady Banuelos Service:  (none) Author Type:  (none)     Filed:  2018  4:07 PM Encounter Date:  2018 Status:  Signed     :  Milady Banuelos            Coming in for a plugged Lt ear, hard to hear  Milady Banuelos ....................  2018   3:02 PM          
Opt out

## 2025-07-28 ENCOUNTER — PATIENT OUTREACH (OUTPATIENT)
Dept: CARE COORDINATION | Facility: CLINIC | Age: 40
End: 2025-07-28
Payer: COMMERCIAL